# Patient Record
Sex: FEMALE | Race: WHITE | Employment: FULL TIME | URBAN - METROPOLITAN AREA
[De-identification: names, ages, dates, MRNs, and addresses within clinical notes are randomized per-mention and may not be internally consistent; named-entity substitution may affect disease eponyms.]

---

## 2018-09-04 ENCOUNTER — OFFICE VISIT (OUTPATIENT)
Dept: SURGERY | Age: 38
End: 2018-09-04

## 2018-09-04 VITALS
DIASTOLIC BLOOD PRESSURE: 78 MMHG | OXYGEN SATURATION: 99 % | WEIGHT: 232 LBS | HEIGHT: 63 IN | TEMPERATURE: 98.8 F | SYSTOLIC BLOOD PRESSURE: 122 MMHG | BODY MASS INDEX: 41.11 KG/M2 | RESPIRATION RATE: 16 BRPM | HEART RATE: 88 BPM

## 2018-09-04 DIAGNOSIS — R10.13 EPIGASTRIC ABDOMINAL PAIN: Primary | ICD-10-CM

## 2018-09-04 PROBLEM — E66.01 OBESITY, MORBID (HCC): Status: ACTIVE | Noted: 2018-09-04

## 2018-09-04 RX ORDER — NORETHINDRONE ACETATE AND ETHINYL ESTRADIOL AND FERROUS FUMARATE 1MG-20(21)
KIT ORAL
Refills: 3 | COMMUNITY
Start: 2018-08-27

## 2018-09-04 RX ORDER — CEPHALEXIN 500 MG/1
CAPSULE ORAL
COMMUNITY
Start: 2018-07-30

## 2018-09-04 NOTE — PROGRESS NOTES
HISTORY OF PRESENT ILLNESS  Олег Herman is a 45 y.o. female who is referred by Christina Simms NP for further evaluation of epigastric abdominal pain. HPI Comments: Ms. Thomas Singh tells me that she has been experiencing epigastric abdominal pain for approximately two weeks now. Pain is intermittent and occurs approximately 30 minutes after meals. No clear relationship between abdominal pain and fatty meals. She has otherwise been in her usual state of health. Abdominal ultrasound - 2018 - Fluid filled gallbladder. No gallstones. Adenomyomatosis. No dilatation of biliary tree. No hydronephrosis. (By report. Films not available.)    Past Medical History:  2018: Epigastric abdominal pain    Past Surgical History:  No date: HX  SECTION      Comment: X2    Review of patient's family history indicates:    Heart Disease                  Mother                    Psychiatric Disorder           Mother                    Stroke                         Mother                    Cancer                         Paternal Grandmother        Comment: breast    Social History: Employment - [employer]. Tobacco - Denies. EtOH - 1-2 drinks per week. Review of systems negative except as noted. Review of Systems   Constitutional: Negative for chills and fever. Respiratory: Positive for shortness of breath. Cardiovascular: Negative for chest pain. Gastrointestinal: Positive for abdominal pain and nausea. Negative for vomiting. No h/o abdominal bloating. Denies leon colored stool. Genitourinary: Negative for dysuria and hematuria. No h/o tea colored urine. Musculoskeletal: Negative for back pain. Physical Exam   Constitutional: She appears well-developed and well-nourished. No distress. HENT:   Head: Normocephalic and atraumatic. Eyes: No scleral icterus. Cardiovascular: Normal rate and regular rhythm.     Pulmonary/Chest: Effort normal and breath sounds normal. Abdominal: Soft. She exhibits no distension. There is no tenderness. There is no rebound and no guarding. Musculoskeletal: Normal range of motion. Lymphadenopathy:     She has no cervical adenopathy. Neurological: She is alert. Vitals reviewed. ASSESSMENT and PLAN  In view of findings on H and P and ultrasound, concerned that Ms. He's symptoms are due to biliary dyskinesia. Will check HIDA Scan and see her following that to review findings with her. If gallbladder EF is low, then she may benefit from cholecystectomy. If gallbladder EF is normal, then will ask GI to see. She is agreeable to this plan and is most certainly free to contact the office should any questions or concerns arise.      CC: Jayant Avendano, SHAHANA

## 2018-09-04 NOTE — MR AVS SNAPSHOT
[address] [address] 
[phone] Patient: Leonid Robertson MRN:  [id number] Visit Information Date & Time Provider Department Dept. Phone Encounter #  
 9/4/2018  3:40 PM MD Janet Rocareddy [phone] [id number] Upcoming Health Maintenance Date Due DTaP/Tdap/Td series (1 - Tdap) 3/6/2001 PAP AKA CERVICAL CYTOLOGY 3/6/2001 Influenza Age 5 to Adult 8/1/2018 Allergies as of 9/4/2018  Review Complete On: 9/4/2018 By: Sravanthi Macedo MD  
 No Known Allergies Current Immunizations  Never Reviewed No immunizations on file. Not reviewed this visit You Were Diagnosed With   
  
 Codes Comments Epigastric abdominal pain    -  Primary ICD-10-CM: R10.13 ICD-9-CM: 789.06 Vitals BP Pulse Temp Resp Height(growth percentile) Weight(growth percentile) 122/78 (BP 1 Location: Right arm, BP Patient Position: Sitting) 88 98.8 °F (37.1 °C) (Oral) 16 5' 3\" (1.6 m) 232 lb (105.2 kg) SpO2 BMI Smoking Status 99% 41.1 kg/m2 Former Smoker Vitals History BMI and BSA Data Body Mass Index Body Surface Area  
 41.1 kg/m 2 2.16 m 2 Your Updated Medication List  
  
   
This list is accurate as of 9/4/18  4:42 PM.  Always use your most recent med list.  
  
  
  
  
 cephALEXin 500 mg capsule Commonly known as:  Eric Srivastava JUNEL FE 1/20 (28) 1 mg-20 mcg (21)/75 mg (7) Tab Generic drug:  norethindrone-ethinyl estradiol TK 1 T PO QD Introducing [facility]! [employer] introduces [employer] patient portal. Now you can access parts of your medical record, email your doctor's office, and request medication refills online. 1. In your internet browser, go to [email or web address] 2. Click on the First Time User? Click Here link in the Sign In box.  You will see the New Member Sign Up page. 3. Enter your [employer] Access Code exactly as it appears below. You will not need to use this code after youve completed the sign-up process. If you do not sign up before the expiration date, you must request a new code. · [employer] Access Code: [email or web address] Expires: 12/3/2018  4:42 PM 
 
4. Enter the last four digits of your Social Security Number (xxxx) and Date of Birth (mm/dd/yyyy) as indicated and click Submit. You will be taken to the next sign-up page. 5. Create a [employer] ID. This will be your [employer] login ID and cannot be changed, so think of one that is secure and easy to remember. 6. Create a [employer] password. You can change your password at any time. 7. Enter your Password Reset Question and Answer. This can be used at a later time if you forget your password. 8. Enter your e-mail address. You will receive e-mail notification when new information is available in [address] 9. Click Sign Up. You can now view and download portions of your medical record. 10. Click the Download Summary menu link to download a portable copy of your medical information. If you have questions, please visit the Frequently Asked Questions section of the [employer] website. Remember, [employer] is NOT to be used for urgent needs. For medical emergencies, dial 911. Now available from your iPhone and Android! Please provide this summary of care documentation to your next provider. Your primary care clinician is listed as Phys Other. If you have any questions after today's visit, please call [phone].

## 2018-09-04 NOTE — PROGRESS NOTES
1. Have you been to the ER, urgent care clinic since your last visit? Hospitalized since your last visit? No    2. Have you seen or consulted any other health care providers outside of the [facility] since your last visit? Include any pap smears or colon screening.  Yes Walter HENDRICKS

## 2018-09-10 ENCOUNTER — HOSPITAL ENCOUNTER (OUTPATIENT)
Dept: NUCLEAR MEDICINE | Age: 38
Discharge: HOME | End: 2018-09-10
Attending: SURGERY
Payer: COMMERCIAL

## 2018-09-10 DIAGNOSIS — R10.13 EPIGASTRIC ABDOMINAL PAIN: ICD-10-CM

## 2018-09-10 PROCEDURE — 78226 HEPATOBILIARY SYSTEM IMAGING: CPT

## 2018-09-12 ENCOUNTER — TELEPHONE (OUTPATIENT)
Dept: SURGERY | Age: 38
End: 2018-09-12

## 2018-09-12 DIAGNOSIS — R10.13 EPIGASTRIC ABDOMINAL PAIN: Primary | ICD-10-CM

## 2018-09-12 NOTE — TELEPHONE ENCOUNTER
Patient identified with two patient identifiers. Patient informed per Dr. Chelsey Evangelista HIDA scan was normal.  We will refer patient to GI specialist for evaluation of epigastric pain. Patient expressed understanding will call if any other questions or concerns.